# Patient Record
Sex: MALE | Race: WHITE | NOT HISPANIC OR LATINO | Employment: OTHER | URBAN - METROPOLITAN AREA
[De-identification: names, ages, dates, MRNs, and addresses within clinical notes are randomized per-mention and may not be internally consistent; named-entity substitution may affect disease eponyms.]

---

## 2018-04-26 DIAGNOSIS — Z86.010 HISTORY OF COLON POLYPS: Primary | ICD-10-CM

## 2018-05-07 ENCOUNTER — HOSPITAL ENCOUNTER (OUTPATIENT)
Facility: HOSPITAL | Age: 78
Discharge: HOME | End: 2018-05-07
Attending: INTERNAL MEDICINE | Admitting: INTERNAL MEDICINE
Payer: MEDICARE

## 2018-05-07 ENCOUNTER — SURGERY (OUTPATIENT)
Age: 78
End: 2018-05-07

## 2018-05-07 ENCOUNTER — ANESTHESIA (OUTPATIENT)
Dept: ENDOSCOPY | Facility: HOSPITAL | Age: 78
End: 2018-05-07
Payer: MEDICARE

## 2018-05-07 ENCOUNTER — ANESTHESIA EVENT (OUTPATIENT)
Dept: ENDOSCOPY | Facility: HOSPITAL | Age: 78
End: 2018-05-07
Payer: MEDICARE

## 2018-05-07 VITALS
WEIGHT: 258 LBS | BODY MASS INDEX: 38.21 KG/M2 | HEART RATE: 70 BPM | TEMPERATURE: 98 F | HEIGHT: 69 IN | SYSTOLIC BLOOD PRESSURE: 141 MMHG | RESPIRATION RATE: 19 BRPM | OXYGEN SATURATION: 95 % | DIASTOLIC BLOOD PRESSURE: 73 MMHG

## 2018-05-07 DIAGNOSIS — K63.5 POLYP OF COLON, UNSPECIFIED PART OF COLON, UNSPECIFIED TYPE: Primary | ICD-10-CM

## 2018-05-07 DIAGNOSIS — Z12.11 SCREEN FOR COLON CANCER: ICD-10-CM

## 2018-05-07 DIAGNOSIS — K64.8 INTERNAL HEMORRHOIDS: ICD-10-CM

## 2018-05-07 DIAGNOSIS — K57.30 DIVERTICULOSIS OF LARGE INTESTINE WITHOUT HEMORRHAGE: ICD-10-CM

## 2018-05-07 PROCEDURE — 45380 COLONOSCOPY AND BIOPSY: CPT | Mod: PT,,, | Performed by: INTERNAL MEDICINE

## 2018-05-07 PROCEDURE — 37000008 HC ANESTHESIA 1ST 15 MINUTES: Performed by: INTERNAL MEDICINE

## 2018-05-07 PROCEDURE — 25000003 PHARM REV CODE 250: Performed by: INTERNAL MEDICINE

## 2018-05-07 PROCEDURE — 25000003 PHARM REV CODE 250: Performed by: NURSE ANESTHETIST, CERTIFIED REGISTERED

## 2018-05-07 PROCEDURE — 88305 TISSUE EXAM BY PATHOLOGIST: CPT | Mod: 26,,, | Performed by: PATHOLOGY

## 2018-05-07 PROCEDURE — 27201012 HC FORCEPS, HOT/COLD, DISP: Performed by: INTERNAL MEDICINE

## 2018-05-07 PROCEDURE — 88305 TISSUE EXAM BY PATHOLOGIST: CPT | Performed by: PATHOLOGY

## 2018-05-07 PROCEDURE — D9220A PRA ANESTHESIA: Mod: PT,ANES,, | Performed by: ANESTHESIOLOGY

## 2018-05-07 PROCEDURE — 45380 COLONOSCOPY AND BIOPSY: CPT | Performed by: INTERNAL MEDICINE

## 2018-05-07 PROCEDURE — D9220A PRA ANESTHESIA: Mod: PT,CRNA,, | Performed by: NURSE ANESTHETIST, CERTIFIED REGISTERED

## 2018-05-07 PROCEDURE — 63600175 PHARM REV CODE 636 W HCPCS: Performed by: NURSE ANESTHETIST, CERTIFIED REGISTERED

## 2018-05-07 PROCEDURE — 37000009 HC ANESTHESIA EA ADD 15 MINS: Performed by: INTERNAL MEDICINE

## 2018-05-07 RX ORDER — SODIUM CHLORIDE 9 MG/ML
INJECTION, SOLUTION INTRAVENOUS CONTINUOUS
Status: DISCONTINUED | OUTPATIENT
Start: 2018-05-07 | End: 2018-05-07 | Stop reason: HOSPADM

## 2018-05-07 RX ORDER — OMEPRAZOLE 40 MG/1
40 CAPSULE, DELAYED RELEASE ORAL DAILY
COMMUNITY

## 2018-05-07 RX ORDER — PARICALCITOL 1 UG/1
1 CAPSULE, LIQUID FILLED ORAL DAILY
COMMUNITY
End: 2022-02-24

## 2018-05-07 RX ORDER — ALLOPURINOL 100 MG/1
400 TABLET ORAL DAILY
COMMUNITY

## 2018-05-07 RX ORDER — CYCLOBENZAPRINE HCL 10 MG
10 TABLET ORAL 3 TIMES DAILY PRN
COMMUNITY
End: 2022-02-24

## 2018-05-07 RX ORDER — ERGOCALCIFEROL 1.25 MG/1
50000 CAPSULE ORAL
COMMUNITY

## 2018-05-07 RX ORDER — LANOLIN ALCOHOL/MO/W.PET/CERES
400 CREAM (GRAM) TOPICAL DAILY
COMMUNITY
End: 2022-02-24 | Stop reason: SDUPTHER

## 2018-05-07 RX ORDER — TAMSULOSIN HYDROCHLORIDE 0.4 MG/1
0.4 CAPSULE ORAL DAILY
COMMUNITY
End: 2022-02-24

## 2018-05-07 RX ORDER — INDOMETHACIN 25 MG/1
25 CAPSULE ORAL 2 TIMES DAILY WITH MEALS
COMMUNITY

## 2018-05-07 RX ORDER — GLYCOPYRROLATE 0.2 MG/ML
INJECTION INTRAMUSCULAR; INTRAVENOUS
Status: DISCONTINUED | OUTPATIENT
Start: 2018-05-07 | End: 2018-05-07

## 2018-05-07 RX ORDER — MULTIVITAMIN
1 TABLET ORAL DAILY
COMMUNITY
End: 2022-02-24

## 2018-05-07 RX ORDER — PROPOFOL 10 MG/ML
VIAL (ML) INTRAVENOUS
Status: DISCONTINUED | OUTPATIENT
Start: 2018-05-07 | End: 2018-05-07

## 2018-05-07 RX ORDER — RAMIPRIL 5 MG/1
5 CAPSULE ORAL DAILY
COMMUNITY
End: 2022-02-24

## 2018-05-07 RX ORDER — DEXTROMETHORPHAN/PSEUDOEPHED 2.5-7.5/.8
DROPS ORAL
Status: DISCONTINUED
Start: 2018-05-07 | End: 2018-05-07 | Stop reason: HOSPADM

## 2018-05-07 RX ORDER — DEXTROMETHORPHAN/PSEUDOEPHED 2.5-7.5/.8
DROPS ORAL
Status: COMPLETED | OUTPATIENT
Start: 2018-05-07 | End: 2018-05-07

## 2018-05-07 RX ORDER — LIDOCAINE HCL/PF 100 MG/5ML
SYRINGE (ML) INTRAVENOUS
Status: DISCONTINUED | OUTPATIENT
Start: 2018-05-07 | End: 2018-05-07

## 2018-05-07 RX ORDER — AMLODIPINE BESYLATE 2.5 MG/1
2.5 TABLET ORAL DAILY
COMMUNITY
End: 2022-02-24

## 2018-05-07 RX ORDER — CHOLECALCIFEROL (VITAMIN D3) 25 MCG
1000 TABLET ORAL DAILY
COMMUNITY

## 2018-05-07 RX ADMIN — PROPOFOL 50 MG: 10 INJECTION, EMULSION INTRAVENOUS at 12:05

## 2018-05-07 RX ADMIN — SIMETHICONE 40 MG: 20 SUSPENSION/ DROPS ORAL at 12:05

## 2018-05-07 RX ADMIN — PROPOFOL 100 MG: 10 INJECTION, EMULSION INTRAVENOUS at 12:05

## 2018-05-07 RX ADMIN — SODIUM CHLORIDE: 0.9 INJECTION, SOLUTION INTRAVENOUS at 11:05

## 2018-05-07 RX ADMIN — GLYCOPYRROLATE 0.2 MG: 0.2 INJECTION, SOLUTION INTRAMUSCULAR; INTRAVENOUS at 12:05

## 2018-05-07 RX ADMIN — LIDOCAINE HYDROCHLORIDE 100 MG: 20 INJECTION, SOLUTION INTRAVENOUS at 12:05

## 2018-05-07 NOTE — OR NURSING
Have you had a colonoscopy LESS THAN 3 years ago?   * If YES, answer these questions*:    NO    1. Did patient have a prior colonic polyp in a previous surveillance/diagnostic colonoscopy and is 18 years or older on date of encounter?      NO  2. Documentation of < 3 year interval since the patients last colonoscopy due to medical reasons (eg., last colonoscopy incomplete, last colonoscopy had inadequate prep, piecemeal removal of adenomas, or last colonoscopy found > 10 adenomas) ?      Last colonoscopy 4 years ago

## 2018-05-07 NOTE — DISCHARGE INSTRUCTIONS
"Discharge Instructions: After Your Surgery/Procedure  Youve just had surgery. During surgery you were given medicine called anesthesia to keep you relaxed and free of pain. After surgery you may have some pain or nausea. This is common. Here are some tips for feeling better and getting well after surgery.     Stay on schedule with your medication.   Going home  Your doctor or nurse will show you how to take care of yourself when you go home. He or she will also answer your questions. Have an adult family member or friend drive you home.      For your safety we recommend these precaution for the first 24 hours after your procedure:  · Do not drive or use heavy equipment.  · Do not make important decisions or sign legal papers.  · Do not drink alcohol.  · Have someone stay with you, if needed. He or she can watch for problems and help keep you safe.  · Your concentration, balance, coordination, and judgement may be impaired for many hours after anesthesia.  Use caution when ambulating or standing up.     · You may feel weak and "washed out" after anesthesia and surgery.      Subtle residual effects of general anesthesia or sedation with regional / local anesthesia can last more than 24 hours.  Rest for the remainder of the day or longer if your Doctor/Surgeon has advised you to do so.  Although you may feel normal within the first 24 hours, your reflexes and mental ability may be impaired without you realizing it.  You may feel dizzy, lightheaded or sleepy for 24 hours or longer.      Be sure to go to all follow-up visits with your doctor. And rest after your surgery for as long as your doctor tells you to.  Coping with pain  If you have pain after surgery, pain medicine will help you feel better. Take it as told, before pain becomes severe. Also, ask your doctor or pharmacist about other ways to control pain. This might be with heat, ice, or relaxation. And follow any other instructions your surgeon or nurse gives " you.  Tips for taking pain medicine  To get the best relief possible, remember these points:  · Pain medicines can upset your stomach. Taking them with a little food may help.  · Most pain relievers taken by mouth need at least 20 to 30 minutes to start to work.  · Taking medicine on a schedule can help you remember to take it. Try to time your medicine so that you can take it before starting an activity. This might be before you get dressed, go for a walk, or sit down for dinner.  · Constipation is a common side effect of pain medicines. Call your doctor before taking any medicines such as laxatives or stool softeners to help ease constipation. Also ask if you should skip any foods. Drinking lots of fluids and eating foods such as fruits and vegetables that are high in fiber can also help. Remember, do not take laxatives unless your surgeon has prescribed them.  · Drinking alcohol and taking pain medicine can cause dizziness and slow your breathing. It can even be deadly. Do not drink alcohol while taking pain medicine.  · Pain medicine can make you react more slowly to things. Do not drive or run machinery while taking pain medicine.  Your health care provider may tell you to take acetaminophen to help ease your pain. Ask him or her how much you are supposed to take each day. Acetaminophen or other pain relievers may interact with your prescription medicines or other over-the-counter (OTC) drugs. Some prescription medicines have acetaminophen and other ingredients. Using both prescription and OTC acetaminophen for pain can cause you to overdose. Read the labels on your OTC medicines with care. This will help you to clearly know the list of ingredients, how much to take, and any warnings. It may also help you not take too much acetaminophen. If you have questions or do not understand the information, ask your pharmacist or health care provider to explain it to you before you take the OTC medicine.  Managing  nausea  Some people have an upset stomach after surgery. This is often because of anesthesia, pain, or pain medicine, or the stress of surgery. These tips will help you handle nausea and eat healthy foods as you get better. If you were on a special food plan before surgery, ask your doctor if you should follow it while you get better. These tips may help:  · Do not push yourself to eat. Your body will tell you when to eat and how much.  · Start off with clear liquids and soup. They are easier to digest.  · Next try semi-solid foods, such as mashed potatoes, applesauce, and gelatin, as you feel ready.  · Slowly move to solid foods. Dont eat fatty, rich, or spicy foods at first.  · Do not force yourself to have 3 large meals a day. Instead eat smaller amounts more often.  · Take pain medicines with a small amount of solid food, such as crackers or toast, to avoid nausea.     Call your surgeon if  · You still have pain an hour after taking medicine. The medicine may not be strong enough.  · You feel too sleepy, dizzy, or groggy. The medicine may be too strong.  · You have side effects like nausea, vomiting, or skin changes, such as rash, itching, or hives.       If you have obstructive sleep apnea  You were given anesthesia medicine during surgery to keep you comfortable and free of pain. After surgery, you may have more apnea spells because of this medicine and other medicines you were given. The spells may last longer than usual.   At home:  · Keep using the continuous positive airway pressure (CPAP) device when you sleep. Unless your health care provider tells you not to, use it when you sleep, day or night. CPAP is a common device used to treat obstructive sleep apnea.  · Talk with your provider before taking any pain medicine, muscle relaxants, or sedatives. Your provider will tell you about the possible dangers of taking these medicines.  © 0449-5874 [employer]. [address] [place] [address]  [place] [address]. All rights reserved. This information is not intended as a substitute for professional medical care. Always follow your healthcare professional's instructions.    Hemorrhoids    Hemorrhoids are swollen and inflamed veins inside the rectum and near the anus. The rectum is the last several inches of the colon. The anus is the passage between the rectum and the outside of the body.  Causes  The veins can become swollen due to increased pressure in them. This is most often caused by:  · Chronic constipation or diarrhea  · Straining when having a bowel movement  · Sitting too long on the toilet  · A low-fiber diet  · Pregnancy  Symptoms  · Bleeding from the rectum (this may be noticeable after bowel movements)  · Lump near the anus  · Itching around the anus  · Pain around the anus  There are different types of hemorrhoids. Depending on the type you have and the severity, you may be able to treat yourself at home. In some cases, a procedure may be the best treatment option. Your healthcare provider can tell you more about this, if needed.  Home care  General care  · To get relief from pain or itching, try:  ¨ Topical products. Your healthcare provider may prescribe or recommend creams, ointments, or pads that can be applied to the hemorrhoid. Use these exactly as directed.  ¨ Medicines. Your healthcare provider may recommend stool softeners, suppositories, or laxatives to help manage constipation. Use these exactly as directed.  ¨ Sitz baths. A sitz bath involves sitting in a few inches of warm bath water. Be careful not to make the water so hot that you burn yourself--test it before sitting in it. Soak for about 10 to 15 minutes a few times a day. This may help relieve pain.  Tips to help prevent hemorrhoids  · Eat more fiber. Fiber adds bulk to stool and absorbs water as it moves through your colon. This makes stool softer and easier to pass.  ¨ Increase the fiber in your diet with more fiber-rich foods.  These include fresh fruit, vegetables, and whole grains.  ¨ Take a fiber supplement or bulking agent, if advised to by your provider. These include products such as psyllium or methylcellulose.  · Drink plenty of water, if directed to by your provider. This can help keep stool soft.  · Be more active. Frequent exercise aids digestion and helps prevent constipation. It may also help make bowel movements more regular.  · Dont strain during bowel movements. This can make hemorrhoids more likely. Also, dont sit on the toilet for long periods of time.  Follow-up care  Follow up with your healthcare provider, or as advised. If a culture or imaging tests were done, you will be notified of the results when they are ready. This may take a few days or longer.  When to seek medical advice  Call your healthcare provider right away if any of these occur:  · Increased bleeding from the rectum  · Increased pain around the rectum or anus  · Weakness or dizziness  Call 911  Call 911 or return to the emergency department right away if any of these occur:  · Trouble breathing or swallowing  · Fainting or loss of consciousness  · Unusually fast heart rate  · Vomiting blood  · Large amounts of blood in stool  Date Last Reviewed: 6/22/2015  © 5856-1780 [employer]. [address] [place] [address], [place] [address]. All rights reserved. This information is not intended as a substitute for professional medical care. Always follow your healthcare professional's instructions.        Diverticulosis    Diverticulosis means that small pouches have formed in the wall of your large intestine (colon). Most often, this problem causes no symptoms and is common as people age. But the pouches in the colon are at risk of becoming infected. When this happens, the condition is called diverticulitis. Although most people with diverticulosis never develop diverticulitis, it is still not uncommon. Rectal bleeding can also occur and in less common  situations, a type of colon inflammation called colitis.  While most people do not have symptoms, some people with diverticulosis may have:  · Abdominal cramps and pain  · Bloating  · Constipation  · Change in bowel habits  Causes  The exact cause of diverticulosis (and diverticulitis) has not been proved, but a few things are associated with the condition:  · Low-fiber diet  · Constipation  · Lack of exercise  Your healthcare provider will talk with you about how to manage your condition. Diet changes may be all that are needed to help control diverticulosis and prevent progression to diverticulitis. If you develop diverticulitis, you will likely need other treatments.  Home care  You may be told to take fiber supplements daily. Fiber adds bulk to the stool so that it passes through the colon more easily. Stool softeners may be recommended. You may also be given medications for pain relief. Be sure to take all medications as directed.  In the past, people were told to avoid corn, nuts, and seeds. This is no longer necessary.  Follow these guidelines when caring for yourself at home:  · Eat unprocessed foods that are high in fiber. Whole grains, fruits, and vegetables are good choices.  · Drink 6 to 8 glasses of water every day unless your healthcare provider has you limit how much fluid you should have.  · Watch for changes in your bowel movements. Tell your provider if you notice any changes.  · Begin an exercise program. Ask your provider how to get started. Generally, walking is the best.  · Get plenty of rest and sleep.  Follow-up care  Follow up with your healthcare provider, or as advised. Regular visits may be needed to check on your health. Sometimes special procedures such as colonoscopy, are needed after an episode of diverticulitis or blooding. Be sure to keep all your appointments.  If a stool sample was taken, or cultures were done, you should be told if they are positive, or if your treatment needs to  be changed. You can call as directed for the results.  If X-rays were done, a radiologist will look at them. You will be told if there is a change in your treatment.  If antibiotics were prescribed, be sure to finish them all.  When to seek medical advice  Call your healthcare provider right away if any of these occur:  · Fever of 100.4°F (38°C) or higher, or as directed by your healthcare provider  · Severe cramps in the lower left side of the abdomen or pain that is getting worse  · Tenderness in the lower left side of the abdomen or worsening pain throughout the abdomen  · Diarrhea or constipation that doesn't get better within 24 hours  · Nausea and vomiting  · Bleeding from the rectum  Call 911  Call emergency services if any of the following occur:  · Trouble breathing  · Confusion  · Very drowsy or trouble awakening  · Fainting or loss of consciousness  · Rapid heart rate  · Chest pain  Date Last Reviewed: 12/30/2015  © 1246-4741 [employer]. [address] [place] [address]. All rights reserved. This information is not intended as a substitute for professional medical care. Always follow your healthcare professional's instructions.        Eating a High-Fiber Diet  Fiber is what gives strength and structure to plants. Most grains, beans, vegetables, and fruits contain fiber. Foods rich in fiber are often low in calories and fat, and they fill you up more. They may also reduce your risks for certain health problems. To find out the amount of fiber in canned, packaged, or frozen foods, read the Nutrition Facts label. It tells you how much fiber is in a serving.    Types of fiber and their benefits  There are two types of fiber: insoluble and soluble. They both aid digestion and help you maintain a healthy weight.  · Insoluble fiber. This is found in whole grains, cereals, certain fruits and vegetables such as apple skin, corn, and carrots. Insoluble fiber may prevent constipation and  reduce the risk for certain types of cancer.  · Soluble fiber. This type of fiber is in oats, beans, and certain fruits and vegetables such as strawberries and peas. Soluble fiber can reduce cholesterol, which may help lower the risk for heart disease. It also helps control blood sugar levels.  Look for high-fiber foods  Try these foods to add fiber to your diet:  · Whole-grain breads and cereals. Try to eat 6 to 8 ounces a day. Include wheat and oat bran cereals, whole-wheat muffins or toast, and corn tortillas in your meals.  · Fruits. Try to eat 2 cups a day. Apples, oranges, strawberries, pears, and bananas are good sources. (Note: Fruit juice is low in fiber.)  · Vegetables. Try to eat at least 2.5 cups a day. Add asparagus, carrots, broccoli, peas, and corn to your meals.  · Beans. One cup of cooked lentils gives you over 15 grams of fiber. Try navy beans, lentils, and chickpeas.  · Seeds. A small handful of seeds gives you about 3 grams of fiber. Try sunflower seeds.  Keep track of your fiber  Keep track of how much fiber you eat. Start by reading food labels. Then eat a variety of foods high in fiber. As you begin to eat more fiber, ask your healthcare provider how much water you should be drinking to keep your digestive system working smoothly.  You should aim for a certain amount of fiber in your diet each day. If you are a woman, that amount is between 25 and 28 grams per day. Men should aim for 30 to 33 grams per day. After age 50, your daily fiber needs drop to 22 grams for women and 28 grams for men.  Before you reach for the fiber supplements, think about this. Fiber is found naturally in healthy whole foods. It gives you that feeling of fullness after you eat. Taking fiber supplements or eating fiber-enriched foods will not give you this full feeling.  Your fiber intake is a good measure for the quality of your overall diet. If you are missing out on your daily amount of fiber, you may be lacking  other important nutrients as well.  Date Last Reviewed: 5/11/2015  © 7420-9756 [employer]. [address] [place] [address], [place] [address]. All rights reserved. This information is not intended as a substitute for professional medical care. Always follow your healthcare professional's instructions.        Colonoscopy     A camera attached to a flexible tube with a viewing lens is used to take video pictures.     Colonoscopy is a test to view the inside of your lower digestive tract (colon and rectum). Sometimes it can show the last part of the small intestine (ileum). During the test, small pieces of tissue may be removed for testing. This is called a biopsy. Small growths, such as polyps, may also be removed.   Why is colonoscopy done?  The test is done to help look for colon cancer. And it can help find the source of abdominal pain, bleeding, and changes in bowel habits. It may be needed once a year, depending on factors such as your:  · Age  · Health history  · Family health history  · Symptoms  · Results from any prior colonoscopy  Risks and possible complications  These include:  · Bleeding               · A puncture or tear in the colon   · Risks of anesthesia  · A cancer lesion not being seen  Getting ready   To prepare for the test:  · Talk with your healthcare provider about the risks of the test (see below). Also ask your healthcare provider about alternatives to the test.  · Tell your healthcare provider about any medicines you take. Also tell him or her about any health conditions you may have.  · Make sure your rectum and colon are empty for the test. Follow the diet and bowel prep instructions exactly. If you dont, the test may need to be rescheduled.  · Plan for a friend or family member to drive you home after the test.     Colonoscopy provides an inside view of the entire colon.     You may discuss the results with your doctor right away or at a future visit.  During the test   The test is  usually done in the hospital on an outpatient basis. This means you go home the same day. The procedure takes about 30 minutes. During that time:  · You are given relaxing (sedating) medicine through an IV line. You may be drowsy, or fully asleep.  · The healthcare provider will first give you a physical exam to check for anal and rectal problems.  · Then the anus is lubricated and the scope inserted.  · If you are awake, you may have a feeling similar to needing to have a bowel movement. You may also feel pressure as air is pumped into the colon. Its OK to pass gas during the procedure.  · Biopsy, polyp removal, or other treatments may be done during the test.  After the test   You may have gas right after the test. It can help to try to pass it to help prevent later bloating. Your healthcare provider may discuss the results with you right away. Or you may need to schedule a follow-up visit to talk about the results. After the test, you can go back to your normal eating and other activities. You may be tired from the sedation and need to rest for a few hours.  Date Last Reviewed: 11/1/2016  © 3260-3742 [employer]. [address] [place] [address]. All rights reserved. This information is not intended as a substitute for professional medical care. Always follow your healthcare professional's instructions.        Understanding Colon and Rectal Polyps    The colon (also called the large intestine) is a muscular tube that forms the last part of the digestive tract. It absorbs water and stores food waste. The colon is about 4 to 6 feet long. The rectum is the last 6 inches of the colon. The colon and rectum have a smooth lining composed of millions of cells. Changes in these cells can lead to growths in the colon that can become cancerous and should be removed. Multiple tests are available to screen for colon cancer, but the colonoscopy is the most recommended test. During colonoscopy, these  polyps can be removed. How often you need this test depends on many things including your condition, your family history, symptoms, and what the findings were at the previous colonoscopy.   When the colon lining changes  Changes that happen in the cells that line the colon or rectum can lead to growths called polyps. Over a period of years, polyps can turn cancerous. Removing polyps early may prevent cancer from ever forming.  Polyps  Polyps are fleshy clumps of tissue that form on the lining of the colon or rectum. Small polyps are usually benign (not cancerous). However, over time, cells in a polyp can change and become cancerous. Certain types of polyps known as adenomatous polyps are premalignant. The risk for invasive cancer increases with the size of the polyp and certain cell and gene features. This means that they can become cancerous if they're not removed. Hyperplastic polyps are benign. They can grow quite large and not turn cancerous.   Cancer  Almost all colorectal cancers start when polyp cells begin growing abnormally. As a cancerous tumor grows, it may involve more and more of the colon or rectum. In time, cancer can also grow beyond the colon or rectum and spread to nearby organs or to glands called lymph nodes. The cells can also travel to other parts of the body. This is known as metastasis. The earlier a cancerous tumor is removed, the better the chance of preventing its spread.    Date Last Reviewed: 8/1/2016  © 3583-2239 [employer]. [address] [place] [address], [place] [address]. All rights reserved. This information is not intended as a substitute for professional medical care. Always follow your healthcare professional's instructions.

## 2018-05-07 NOTE — ANESTHESIA PREPROCEDURE EVALUATION
05/07/2018  Garcia Molina is a 77 y.o., male.    Anesthesia Evaluation    I have reviewed the Patient Summary Reports.    I have reviewed the Nursing Notes.   I have reviewed the Medications.     Review of Systems  Anesthesia Hx:  Denies Family Hx of Anesthesia complications.   Denies Personal Hx of Anesthesia complications.   Cardiovascular:   Hypertension    Pulmonary:   Shortness of breath Sleep Apnea Intolerance for CPAP   Renal/:  Renal/ Normal     Hepatic/GI:   Bowel Prep.    Endocrine:   Diabetes, type 2        Physical Exam  General:  Obesity    Airway/Jaw/Neck:  Airway Findings: Mouth Opening: Normal Tongue: Normal  General Airway Assessment: Adult  Mallampati: IV  Improves to III with phonation.  TM Distance: Normal, at least 6 cm  Jaw/Neck Findings:  Neck ROM: Extension Decreased, Mild  Neck Findings:  Girth Increased      Dental:  Dental Findings:   Chest/Lungs:  Chest/Lungs Clear    Heart/Vascular:  Heart Findings: Normal            Anesthesia Plan  Type of Anesthesia, risks & benefits discussed:  Anesthesia Type:  general  Patient's Preference:   Intra-op Monitoring Plan: standard ASA monitors  Intra-op Monitoring Plan Comments:   Post Op Pain Control Plan:   Post Op Pain Control Plan Comments:   Induction:   IV  Beta Blocker:  Patient is not currently on a Beta-Blocker (No further documentation required).       Informed Consent: Patient understands risks and agrees with Anesthesia plan.  Questions answered. Anesthesia consent signed with patient.  ASA Score: 4     Day of Surgery Review of History & Physical:    H&P update referred to the provider.         Ready For Surgery From Anesthesia Perspective.

## 2018-05-07 NOTE — TRANSFER OF CARE
"Anesthesia Transfer of Care Note    Patient: Garcia Molina    Procedure(s) Performed: Procedure(s) (LRB):  COLONOSCOPY (N/A)    Patient location: PACU    Anesthesia Type: general    Transport from OR: Transported from OR on 2-3 L/min O2 by NC with adequate spontaneous ventilation    Post pain: adequate analgesia    Post assessment: no apparent anesthetic complications and tolerated procedure well    Post vital signs: stable    Level of consciousness: awake, alert and oriented    Nausea/Vomiting: no nausea/vomiting    Complications: none    Transfer of care protocol was followed      Last vitals:   Visit Vitals  BP (!) 160/78 (BP Location: Left arm, Patient Position: Lying)   Pulse (!) 55   Temp 36.9 °C (98.4 °F) (Temporal)   Resp (!) 24   Ht 5' 8.5" (1.74 m)   Wt 117 kg (258 lb)   SpO2 96%   BMI 38.66 kg/m²     "

## 2018-05-07 NOTE — PROVATION PATIENT INSTRUCTIONS
Discharge Summary/Instructions after an Endoscopic Procedure  Patient Name: Garcia Molina  Patient MRN: [id number]  Patient YOB: 1940  Monday, May 07, 2018  Don Guajardo MD  RESTRICTIONS:  During your procedure today, you received medications for sedation.  These   medications may affect your judgment, balance and coordination.  Therefore,   for 24 hours, you have the following restrictions:   - DO NOT drive a car, operate machinery, make legal/financial decisions,   sign important papers or drink alcohol.    ACTIVITY:  The following day: return to full activity including work, except no heavy   lifting, straining or running for 3 days if polyps were removed.  DIET:  Eat and drink normally unless instructed otherwise.     TREATMENT FOR COMMON SIDE EFFECTS:  - Mild abdominal pain, nausea, belching, bloating or excessive gas:  rest,   eat lightly and use a heating pad.  - Sore Throat: treat with throat lozenges and/or gargle with warm salt   water.  - Because air was used during the procedure, expelling large amounts of air   from your rectum or belching is normal.  - If a bowel prep was taken, you may not have a bowel movement for 1-3 days.    This is normal.  SYMPTOMS TO WATCH FOR AND REPORT TO YOUR PHYSICIAN:  1. Abdominal pain or bloating, other than gas cramps.  2. Chest pain.  3. Back pain.  4. Signs of infection such as: chills or fever occurring within 24 hours   after the procedure.  5. Rectal bleeding, which would show as bright red, maroon, or black stools.   (A tablespoon of blood from the rectum is not serious, especially if   hemorrhoids are present.)  6. Vomiting.  7. Weakness or dizziness.  GO DIRECTLY TO THE NEAREST EMERGENCY ROOM IF YOU HAVE ANY OF THE FOLLOWING:      Difficulty breathing              Chills and/or fever over 101 F   Persistent vomiting and/or vomiting blood   Severe abdominal pain   Severe chest pain   Black, tarry stools   Bleeding- more than one tablespoon   Any other  symptom or condition that you feel may need urgent attention  Your doctor recommends these additional instructions:  If any biopsies were taken, your doctors clinic will contact you in 1 to 2   weeks with any results.  - Patient has a contact number available for emergencies.  The signs and   symptoms of potential delayed complications were discussed with the   patient.  Return to normal activities tomorrow.  Written discharge   instructions were provided to the patient.   - High fiber diet.   - Continue present medications.   - Await pathology results.   - Repeat colonoscopy in 5 years for surveillance.   - Discharge patient to home (ambulatory).   - Return to my office PRN.  For questions, problems or results please call your physician - Don Guajardo MD at Work:  [phone].  [facility], EMERGENCY ROOM PHONE NUMBER: [phone]  IF A COMPLICATION OR EMERGENCY SITUATION ARISES AND YOU ARE UNABLE TO REACH   YOUR PHYSICIAN - GO DIRECTLY TO THE EMERGENCY ROOM.  Don Guajardo MD  5/7/2018 12:29:12 PM  This report has been verified and signed electronically.

## 2018-05-07 NOTE — ANESTHESIA POSTPROCEDURE EVALUATION
"Anesthesia Post Evaluation    Patient: Garcia Molina    Procedure(s) Performed: Procedure(s) (LRB):  COLONOSCOPY (N/A)    Final Anesthesia Type: general  Patient location during evaluation: PACU  Patient participation: Yes- Able to Participate  Level of consciousness: awake and alert  Post-procedure vital signs: reviewed and stable  Pain management: adequate  Airway patency: patent  PONV status at discharge: No PONV  Anesthetic complications: no      Cardiovascular status: blood pressure returned to baseline  Respiratory status: unassisted  Hydration status: euvolemic  Follow-up not needed.        Visit Vitals  BP (!) 141/73   Pulse 70   Temp 36.5 °C (97.7 °F) (Temporal)   Resp 19   Ht 5' 8.5" (1.74 m)   Wt 117 kg (258 lb)   SpO2 95%   BMI 38.66 kg/m²       Pain/Amy Score: Pain Assessment Performed: Yes (5/7/2018 12:28 PM)  Presence of Pain: denies (5/7/2018 12:55 PM)  Amy Score: 10 (5/7/2018 12:55 PM)      "

## 2018-05-07 NOTE — H&P
CC: History of colon polyps - last scope greater than 3 years ago    77 year old male with above. States that symptoms are absent, no alleviating/exacerbating factors. No family history of CA. Positive personal history of polyps. No bleeding or weight loss.     ROS:  No headache, no fever/chills, no chest pain/SOB, no nausea/vomiting/diarrhea/constipation/GI bleeding/abdominal pain, no dysuria/hematuria.    VSSAF   Exam:   Alert and oriented x 3; no apparent distress   PERRLA, sclera anicteric  CV: Regular rate/rhythm, normal PMI   Lungs: Clear bilaterally with no wheeze/rales   Abdomen: Soft, NT/ND, normal bowel sounds   Ext: No cyanosis, clubbing     Impression:   As above    Plan:   Proceed with endoscopy. Further recs to follow.

## 2018-05-07 NOTE — PLAN OF CARE
Patient awake, alert, and oriented.  No complaints of pain or discomfort at present time. Passing flatus; tolerating po fluids well.  Dr. Guajardo spoke with patient and family prior to discharge;    Abdomen soft and nontender;  Ambulates without difficulty;  All instructions given and reviewed with patient and family;  Stable for discharge to home accompanied by family

## 2021-01-14 ENCOUNTER — IMMUNIZATION (OUTPATIENT)
Dept: FAMILY MEDICINE | Facility: CLINIC | Age: 81
End: 2021-01-14
Payer: MEDICARE

## 2021-01-14 DIAGNOSIS — Z23 NEED FOR VACCINATION: ICD-10-CM

## 2021-01-14 PROCEDURE — 91301 COVID-19, MRNA, LNP-S, PF, 100 MCG/0.5 ML DOSE VACCINE: CPT | Mod: ,,, | Performed by: FAMILY MEDICINE

## 2021-01-14 PROCEDURE — 91301 COVID-19, MRNA, LNP-S, PF, 100 MCG/0.5 ML DOSE VACCINE: ICD-10-PCS | Mod: ,,, | Performed by: FAMILY MEDICINE

## 2021-01-14 PROCEDURE — 0011A COVID-19, MRNA, LNP-S, PF, 100 MCG/0.5 ML DOSE VACCINE: CPT | Mod: ,,, | Performed by: FAMILY MEDICINE

## 2021-01-14 PROCEDURE — 0011A COVID-19, MRNA, LNP-S, PF, 100 MCG/0.5 ML DOSE VACCINE: ICD-10-PCS | Mod: ,,, | Performed by: FAMILY MEDICINE

## 2021-02-10 PROCEDURE — 91301 COVID-19, MRNA, LNP-S, PF, 100 MCG/0.5 ML DOSE VACCINE: ICD-10-PCS | Mod: S$GLB,,, | Performed by: FAMILY MEDICINE

## 2021-02-10 PROCEDURE — 0012A COVID-19, MRNA, LNP-S, PF, 100 MCG/0.5 ML DOSE VACCINE: ICD-10-PCS | Mod: CV19,S$GLB,, | Performed by: FAMILY MEDICINE

## 2021-02-10 PROCEDURE — 91301 COVID-19, MRNA, LNP-S, PF, 100 MCG/0.5 ML DOSE VACCINE: CPT | Mod: S$GLB,,, | Performed by: FAMILY MEDICINE

## 2021-02-10 PROCEDURE — 0012A COVID-19, MRNA, LNP-S, PF, 100 MCG/0.5 ML DOSE VACCINE: CPT | Mod: CV19,S$GLB,, | Performed by: FAMILY MEDICINE

## 2021-02-11 ENCOUNTER — IMMUNIZATION (OUTPATIENT)
Dept: FAMILY MEDICINE | Facility: CLINIC | Age: 81
End: 2021-02-11
Payer: MEDICARE

## 2021-02-11 DIAGNOSIS — Z23 NEED FOR VACCINATION: Primary | ICD-10-CM

## 2022-02-18 ENCOUNTER — OFFICE VISIT (OUTPATIENT)
Dept: PODIATRY | Facility: CLINIC | Age: 82
End: 2022-02-18
Payer: MEDICARE

## 2022-02-18 VITALS — HEIGHT: 69 IN | TEMPERATURE: 98 F | WEIGHT: 200 LBS | RESPIRATION RATE: 18 BRPM | BODY MASS INDEX: 29.62 KG/M2

## 2022-02-18 DIAGNOSIS — L60.0 INGROWN NAIL OF GREAT TOE OF RIGHT FOOT: Primary | ICD-10-CM

## 2022-02-18 DIAGNOSIS — E11.21 CONTROLLED TYPE 2 DIABETES MELLITUS WITH DIABETIC NEPHROPATHY, UNSPECIFIED WHETHER LONG TERM INSULIN USE: ICD-10-CM

## 2022-02-18 PROCEDURE — 99203 PR OFFICE/OUTPT VISIT, NEW, LEVL III, 30-44 MIN: ICD-10-PCS | Mod: 25,S$PBB,, | Performed by: PODIATRIST

## 2022-02-18 PROCEDURE — 11730 NAIL REMOVAL: ICD-10-PCS | Mod: T5,S$PBB,, | Performed by: PODIATRIST

## 2022-02-18 PROCEDURE — 99999 PR PBB SHADOW E&M-EST. PATIENT-LVL IV: CPT | Mod: PBBFAC,,, | Performed by: PODIATRIST

## 2022-02-18 PROCEDURE — 11730 AVULSION NAIL PLATE SIMPLE 1: CPT | Mod: T5,PBBFAC | Performed by: PODIATRIST

## 2022-02-18 PROCEDURE — 99214 OFFICE O/P EST MOD 30 MIN: CPT | Mod: PBBFAC | Performed by: PODIATRIST

## 2022-02-18 PROCEDURE — 99999 PR PBB SHADOW E&M-EST. PATIENT-LVL IV: ICD-10-PCS | Mod: PBBFAC,,, | Performed by: PODIATRIST

## 2022-02-18 PROCEDURE — 99203 OFFICE O/P NEW LOW 30 MIN: CPT | Mod: 25,S$PBB,, | Performed by: PODIATRIST

## 2022-02-18 RX ORDER — PREDNISOLONE ACETATE 10 MG/ML
1 SUSPENSION/ DROPS OPHTHALMIC
COMMUNITY
Start: 2021-04-09 | End: 2022-02-24

## 2022-02-18 RX ORDER — IPRATROPIUM BROMIDE AND ALBUTEROL 20; 100 UG/1; UG/1
1 SPRAY, METERED RESPIRATORY (INHALATION) 4 TIMES DAILY
COMMUNITY
Start: 2021-08-19 | End: 2022-08-18

## 2022-02-18 RX ORDER — MOXIFLOXACIN 5 MG/ML
0.5 SOLUTION/ DROPS OPHTHALMIC
COMMUNITY
Start: 2021-05-05 | End: 2022-02-24

## 2022-02-18 RX ORDER — FOLIC ACID 1 MG/1
1 TABLET ORAL DAILY
COMMUNITY
Start: 2022-02-14 | End: 2022-02-24

## 2022-02-18 RX ORDER — ACETAMINOPHEN 500 MG
2 TABLET ORAL DAILY
COMMUNITY
Start: 2021-08-19 | End: 2022-02-24

## 2022-02-18 RX ORDER — BROMFENAC 1.03 MG/ML
0.09 SOLUTION/ DROPS OPHTHALMIC
COMMUNITY
Start: 2021-05-05 | End: 2022-02-24

## 2022-02-18 RX ORDER — FOLIC ACID 1 MG/1
1000 TABLET ORAL DAILY
COMMUNITY
Start: 2022-02-14

## 2022-02-18 RX ORDER — LIDOCAINE 50 MG/G
PATCH TOPICAL
COMMUNITY
Start: 2021-09-27

## 2022-02-18 RX ORDER — LOSARTAN POTASSIUM 100 MG/1
1 TABLET ORAL DAILY
COMMUNITY
Start: 2022-02-02 | End: 2022-02-24 | Stop reason: SDUPTHER

## 2022-02-18 RX ORDER — UMECLIDINIUM BROMIDE AND VILANTEROL TRIFENATATE 62.5; 25 UG/1; UG/1
POWDER RESPIRATORY (INHALATION)
COMMUNITY
Start: 2021-08-19 | End: 2022-08-18

## 2022-02-18 RX ORDER — OMEPRAZOLE 40 MG/1
1 CAPSULE, DELAYED RELEASE ORAL DAILY
COMMUNITY
Start: 2021-05-07 | End: 2022-02-24 | Stop reason: SDUPTHER

## 2022-02-18 RX ORDER — CHLORTHALIDONE 50 MG/1
50 TABLET ORAL DAILY
COMMUNITY
Start: 2022-02-07 | End: 2022-02-24

## 2022-02-18 RX ORDER — ATORVASTATIN CALCIUM 40 MG/1
1 TABLET, FILM COATED ORAL DAILY
COMMUNITY
Start: 2021-05-07 | End: 2022-02-24

## 2022-02-18 RX ORDER — CHLORTHALIDONE 50 MG/1
1 TABLET ORAL DAILY
COMMUNITY
Start: 2021-05-01 | End: 2022-02-24

## 2022-02-18 RX ORDER — ALLOPURINOL 100 MG/1
100 TABLET ORAL
COMMUNITY
Start: 2021-05-07 | End: 2022-02-24 | Stop reason: SDUPTHER

## 2022-02-18 RX ORDER — LIDOCAINE 50 MG/G
PATCH TOPICAL
COMMUNITY
Start: 2021-09-27 | End: 2022-02-24 | Stop reason: SDUPTHER

## 2022-02-18 RX ORDER — LOSARTAN POTASSIUM 100 MG/1
100 TABLET ORAL DAILY
COMMUNITY
Start: 2022-02-02

## 2022-02-18 RX ORDER — ZINC GLUCONATE 50 MG
1000 TABLET ORAL DAILY
COMMUNITY
Start: 2022-02-07

## 2022-02-18 RX ORDER — CALCIUM CARBONATE 300MG(750)
400 TABLET,CHEWABLE ORAL
COMMUNITY
Start: 2022-02-02 | End: 2023-02-01

## 2022-02-18 RX ORDER — ACETAMINOPHEN 500 MG
TABLET ORAL
COMMUNITY
Start: 2022-02-07 | End: 2022-02-24

## 2022-02-25 NOTE — PROGRESS NOTES
Subjective:       Patient ID: Garcia Molina is a 81 y.o. male.    Chief Complaint: Ingrown Toenail and Diabetes Mellitus  Patient present as a walk-in with complaint painful ingrown nail right great toe.  Relates areas been slowly progressing, he is not able to trim, treat area, limited mobility.  States it is very deep, red and swollen at times, no drainage.  Has a history of type 2 diabetes and states he is not currently taking medication for diabetes.     Past Medical History:   Diagnosis Date    Anticoagulant long-term use     Arthritis     Cancer     testicular    Diabetes mellitus     Hypertension     SOB (shortness of breath)     Testicular abnormality      Past Surgical History:   Procedure Laterality Date    BACK SURGERY      Barrie Knee Replacements      COLONOSCOPY N/A 2018    Procedure: COLONOSCOPY;  Surgeon: Don Willis MD;  Location: [facility];  Service: Endoscopy;  Laterality: N/A;    Crushed back discs      JOINT REPLACEMENT      bilate knees     History reviewed. No pertinent family history.  Social History     Socioeconomic History    Marital status:    Tobacco Use    Smoking status: Former Smoker     Quit date: 1980     Years since quittin.0    Smokeless tobacco: Never Used    Tobacco comment: quit 30 years ago   Substance and Sexual Activity    Alcohol use: No     Alcohol/week: 0.0 standard drinks     Comment: quit 30 years ago    Drug use: No       Current Outpatient Medications   Medication Sig Dispense Refill    allopurinol (ZYLOPRIM) 100 MG tablet Take 400 mg by mouth once daily.      cyanocobalamin-salcaprozat sod 1,000-100 mcg-mg Tab Take 1 tablet by mouth once daily.      ergocalciferol (ERGOCALCIFEROL) 50,000 unit Cap Take 50,000 Units by mouth every 7 days.      folic acid (FOLVITE) 1 MG tablet Take 1,000 mcg by mouth once daily.      ipratropium-albuteroL (COMBIVENT RESPIMAT)  mcg/actuation inhaler Inhale 1 puff into the lungs 4  "(four) times daily.      LIDOcaine (LIDODERM) 5 % SMARTSIG:Topical      lisinopril (PRINIVIL,ZESTRIL) 40 MG tablet Take 40 mg by mouth once daily.      losartan (COZAAR) 100 MG tablet Take 100 mg by mouth once daily.      magnesium oxide 400 mg magnesium Tab Take 400 mg by mouth.      omeprazole (PRILOSEC) 40 MG capsule Take 40 mg by mouth once daily.      tiotropium (SPIRIVA) 18 mcg inhalation capsule Inhale 18 mcg into the lungs once daily.      umeclidinium-vilanteroL (ANORO ELLIPTA) 62.5-25 mcg/actuation DsDv Inhale into the lungs.      VITAMIN B-12 1000 MCG tablet Take 1,000 mcg by mouth once daily.      vitamin D 1000 units Tab Take 1,000 Units by mouth once daily.      furosemide (LASIX) 20 MG tablet Take 20 mg by mouth 2 (two) times daily.      indomethacin (INDOCIN) 25 MG capsule Take 25 mg by mouth 2 (two) times daily with meals.      loratadine (CLARITIN) 10 mg tablet Take 10 mg by mouth once daily.         No current facility-administered medications for this visit.     Review of patient's allergies indicates:  No Known Allergies    Review of Systems   HENT: Negative for congestion.    Respiratory: Negative for cough and shortness of breath.    Cardiovascular: Negative for leg swelling.   Musculoskeletal: Negative for gait problem.   All other systems reviewed and are negative.      Objective:      Vitals:    02/18/22 1356   Resp: 18   Temp: 98.2 °F (36.8 °C)   TempSrc: Oral   Weight: 90.7 kg (200 lb)   Height: 5' 8.5" (1.74 m)     Physical Exam  Vitals and nursing note reviewed.   Constitutional:       General: He is not in acute distress.     Appearance: Normal appearance.   Cardiovascular:      Pulses:           Dorsalis pedis pulses are 2+ on the right side and 2+ on the left side.        Posterior tibial pulses are 1+ on the right side and 1+ on the left side.   Pulmonary:      Effort: Pulmonary effort is normal.   Musculoskeletal:      Right foot: Decreased range of motion. Bunion " present.      Left foot: Decreased range of motion. Bunion (mild HAV b/l) present.   Feet:      Right foot:      Skin integrity: Erythema (mild, deep ingrown nail medial right, tender, no infection ) present.      Toenail Condition: Right toenails are ingrown.      Left foot:      Skin integrity: No erythema (absent nail left hallux. No skin complications ).   Skin:     Capillary Refill: Capillary refill takes 2 to 3 seconds.   Neurological:      General: No focal deficit present.      Mental Status: He is alert.   Psychiatric:         Mood and Affect: Mood normal.         Behavior: Behavior normal.                        Assessment:       1. Ingrown nail of great toe of right foot    2. Controlled type 2 diabetes mellitus with diabetic nephropathy, unspecified whether long term insulin use        Plan:           Attempted to debride and trim ingrown nail medial right hallux, too painful for patient and we discussed nail avulsion procedure.  Reviewed injections to anesthetize the toe, procedure, care of area afterwards potential for recurrence.  Patient was in understanding and agreement with treatment plan, did want to pursue nail avulsion medial right hallux  See procedure report attached.    Patient tolerated well.    Reviewed written and verbal home going instructions.    Reviewed progressing signs of infection to monitor for.  Reviewed diabetic education, potential complications regarding feet  Instructed patient to check feet daily, contact the office with any area of redness, swelling, pain which has not improved in a few days  I counseled the patient on their conditions, implications and medical management.  Instructed patient to contact the office with any changes, questions, concerns, worsening of symptoms.   Total face to face time, exam, assessment, treatment, discussion, documentation 20 minutes, more than half this time spent on consultation and coordination of care.  Additional time required for  procedure/nail avulsion medial right hallux  Follow up as needed, if right great toe is dry, pain-free and 10 days      This note was created using [employer] voice recognition software that occasionally misinterpreted phrases or words.

## 2022-02-25 NOTE — PROCEDURES
Nail Removal    Date/Time: 2/18/2022 2:30 AM  Performed by: Susan Sorto DPM  Authorized by: Susan Sorto DPM     Consent Done?:  Yes (Written)  Location:     Location:  Right foot    Location detail:  Right big toe  Anesthesia:     Anesthesia:  Digital block    Local anesthetic:  Lidocaine 1% without epinephrine and bupivacaine 0.5% without epinephrine    Anesthetic total (ml):  6 (3mL each)  Procedure Details:     Preparation:  Skin prepped with alcohol    Amount removed:  Partial (Medial)    Wedge excision of skin of nail fold: No      Nail bed sutured?: No      Nail matrix removed:  None    Dressing applied:  Antibiotic ointment (Telfa, gauze, Coban)    Patient tolerance:  Patient tolerated the procedure well with no immediate complications

## 2023-04-21 ENCOUNTER — TELEPHONE (OUTPATIENT)
Dept: GASTROENTEROLOGY | Facility: CLINIC | Age: 83
End: 2023-04-21
Payer: MEDICARE

## 2023-04-21 NOTE — TELEPHONE ENCOUNTER
----- Message from Maria Elena Eid sent at 4/21/2023 10:51 AM CDT -----  Contact: self  Type:  Needs Medical Advice    Who Called: self    Would the patient rather a call back or a response via [facility]? call  Best Call Back Number: [phone] (home)     Additional Information: pt received a letter in the mail to schedule his colonoscopy. Please advise and thank you.

## 2023-04-21 NOTE — TELEPHONE ENCOUNTER
Returned call to patient to assist in scheduling a colonoscopy. Patient unable to confirm transportation at this time. Suggested to patient to reach out to Medicare and [employer] about medical transportation. Date 7/5 reserved for patient.

## 2023-06-07 ENCOUNTER — TELEPHONE (OUTPATIENT)
Dept: GASTROENTEROLOGY | Facility: CLINIC | Age: 83
End: 2023-06-07
Payer: MEDICARE

## 2023-06-07 NOTE — TELEPHONE ENCOUNTER
----- Message from Cipriano Nava sent at 6/7/2023  1:50 PM CDT -----  Type:  Sooner Appointment Request    Caller is requesting a sooner appointment.  Caller declined first available appointment listed below.  Caller will not accept being placed on the waitlist and is requesting a message be sent to doctor.    Name of Caller:  pt  When is the first available appointment?  none  Symptoms:  colonoscopy--please call and advise  Best Call Back Number:  [phone]     Additional Information:  pt said he got a letter in the mail saying it was time for another one--thank you

## 2023-06-07 NOTE — TELEPHONE ENCOUNTER
Returned call to patient to advise that he didn't have procedure due to not having a ride. Patient states that he wanted to cancel anyway due to his age.